# Patient Record
Sex: MALE | Race: WHITE | Employment: OTHER | URBAN - METROPOLITAN AREA
[De-identification: names, ages, dates, MRNs, and addresses within clinical notes are randomized per-mention and may not be internally consistent; named-entity substitution may affect disease eponyms.]

---

## 2018-06-16 ENCOUNTER — OFFICE VISIT (OUTPATIENT)
Dept: URGENT CARE | Facility: URGENT CARE | Age: 63
End: 2018-06-16

## 2018-06-16 VITALS
RESPIRATION RATE: 16 BRPM | WEIGHT: 265 LBS | OXYGEN SATURATION: 96 % | TEMPERATURE: 97.7 F | HEART RATE: 89 BPM | DIASTOLIC BLOOD PRESSURE: 75 MMHG | SYSTOLIC BLOOD PRESSURE: 141 MMHG

## 2018-06-16 DIAGNOSIS — L03.032 CELLULITIS OF GREAT TOE OF LEFT FOOT: Primary | ICD-10-CM

## 2018-06-16 PROCEDURE — 99213 OFFICE O/P EST LOW 20 MIN: CPT | Performed by: INTERNAL MEDICINE

## 2018-06-16 RX ORDER — ATORVASTATIN CALCIUM 10 MG/1
10 TABLET, FILM COATED ORAL DAILY
COMMUNITY

## 2018-06-16 RX ORDER — MUPIROCIN 20 MG/G
OINTMENT TOPICAL 3 TIMES DAILY
Qty: 22 G | Refills: 1 | Status: SHIPPED | OUTPATIENT
Start: 2018-06-16 | End: 2018-06-21

## 2018-06-16 RX ORDER — TESTOSTERONE 1.62 MG/G
GEL TRANSDERMAL
COMMUNITY

## 2018-06-16 RX ORDER — SULFAMETHOXAZOLE/TRIMETHOPRIM 800-160 MG
1 TABLET ORAL 2 TIMES DAILY
Qty: 20 TABLET | Refills: 0 | Status: SHIPPED | OUTPATIENT
Start: 2018-06-16

## 2018-06-18 ENCOUNTER — OFFICE VISIT (OUTPATIENT)
Dept: URGENT CARE | Facility: URGENT CARE | Age: 63
End: 2018-06-18

## 2018-06-18 VITALS
TEMPERATURE: 97 F | OXYGEN SATURATION: 96 % | RESPIRATION RATE: 16 BRPM | HEART RATE: 87 BPM | SYSTOLIC BLOOD PRESSURE: 128 MMHG | DIASTOLIC BLOOD PRESSURE: 82 MMHG

## 2018-06-18 DIAGNOSIS — N48.1 BALANITIS: ICD-10-CM

## 2018-06-18 DIAGNOSIS — L03.032 CELLULITIS OF TOE OF LEFT FOOT: Primary | ICD-10-CM

## 2018-06-18 PROCEDURE — 99214 OFFICE O/P EST MOD 30 MIN: CPT

## 2018-06-18 RX ORDER — CLINDAMYCIN HCL 300 MG
300 CAPSULE ORAL 4 TIMES DAILY
Qty: 32 CAPSULE | Refills: 0 | Status: SHIPPED | OUTPATIENT
Start: 2018-06-18

## 2018-06-18 RX ORDER — FLUCONAZOLE 150 MG/1
150 TABLET ORAL
Qty: 3 TABLET | Refills: 0 | Status: SHIPPED | OUTPATIENT
Start: 2018-06-18

## 2018-06-18 NOTE — PATIENT INSTRUCTIONS
Discharge Instructions for Cellulitis  You have been diagnosed with cellulitis. This is an infection in the deepest layer of the skin. In some cases, the infection also affects the muscle. Cellulitis is caused by bacteria. The bacteria can enter the body through broken skin. This can happen with a cut, scratch, animal bite, or an insect bite that has been scratched. You may have been treated in the hospital with antibiotics and fluids. You will likely be given a prescription for antibiotics to take at home. This sheet will help you take care of yourself at home.  Home care  When you are home:    Take the prescribed antibiotic medicine you are given as directed until it is gone. Take it even if you feel better. It treats the infection and stops it from returning. Not taking all the medicine can make future infections hard to treat.    Keep the infected area clean.    When possible, raise the infected area above the level of your heart. This helps keep swelling down.    Talk with your healthcare provider if you are in pain. Ask what kind of over-the-counter medicine you can take for pain.    Apply clean bandages as advised.    Take your temperature once a day for a week.    Wash your hands often to prevent spreading the infection.  In the future, wash your hands before and after you touch cuts, scratches, or bandages. This will help prevent infection.   When to call your healthcare provider  Call your healthcare provider immediately if you have any of the following:    Difficulty or pain when moving the joints above or below the infected area    Discharge or pus draining from the area    Fever of 100.4 F (38 C) or higher, or as directed by your healthcare provider    Pain that gets worse in or around the infected     Redness that gets worse in or around the infected area, particularly if the area of redness expands to a wider area    Shaking chills    Swelling of the infected area    Vomiting   Date Last Reviewed:  8/1/2016 2000-2017 [employer]. [address] [place] [address], [place] [address]. All rights reserved. This information is not intended as a substitute for professional medical care. Always follow your healthcare professional's instructions.      Balanitis    Balanitis is an inflammation of the head of the penis. It can happen because of a buildup of germs (bacteria, viruses, or fungi) under the foreskin. It can also happen because of exposure to soaps and other chemicals. In adults, this is most often a complication of diabetes. It can also happen because of obesity or poor genital cleaning habits.  If it is not treated right away, this can lead to a condition called phimosis. This means you cannot pull back the foreskin from the head of the penis.  Symptoms of balanitis may include pain or tenderness of the penis, discharge, inability to retract the foreskin, difficulty urinating, and impotence.  Home care  The following guidelines will help you care for your condition at home:    If you are able to retract your foreskin:  ? Children. Retract the foreskin and clean with water. Apply antibiotic cream or ointment to the penis three times a day.  ? Adults. Retract the foreskin and clean with water. Apply clotrimazole cream to the penis 3 times a day unless another medicine was prescribed. Clotrimazole cream is available over the counter. Avoid sexual activity while being treated.  ? Sitz baths. Soak the penis and foreskin in warm water while inflammation is present.    If you have diabetes, talk with your doctor about keeping your diabetes in good control.    If you are overweight, talk with your doctor about a weight loss plan.  Follow-up care  Follow up with your healthcare provider, or as advised.  When to seek medical advice  Call your healthcare provider right away if any of these occur:    You can't retract the foreskin    You can't return the retracted foreskin to the forward position. This requires  immediate attention!    Your symptoms get worse    You have partial or complete blockage of the flow of urine  Date Last Reviewed: 9/1/2016 2000-2017 [employer]. [address] [place] [address], [place] [address]. All rights reserved. This information is not intended as a substitute for professional medical care. Always follow your healthcare professional's instructions.    Please take clindamycin 4x a day with food.  Take a probiotic.  Call if worsening condition.  For your fungal infection take diflucan 150 mg every 3 days for a total of 3 doses.  Keep area clearn, no sex.  Please follow up with PCP after treatment for both conditions for re-evaluation.    Yoan Guan MD

## 2018-06-18 NOTE — MR AVS SNAPSHOT
After Visit Summary   6/18/2018    Wander Mayfield    MRN: [id number]           Patient Information     Date Of Birth          1955        Visit Information        Provider Department      6/18/2018 5:15 PM  URGENT CARE [facility]        Today's Diagnoses     Cellulitis of toe of left foot    -  1    Balanitis          Care Instructions      Discharge Instructions for Cellulitis  You have been diagnosed with cellulitis. This is an infection in the deepest layer of the skin. In some cases, the infection also affects the muscle. Cellulitis is caused by bacteria. The bacteria can enter the body through broken skin. This can happen with a cut, scratch, animal bite, or an insect bite that has been scratched. You may have been treated in the hospital with antibiotics and fluids. You will likely be given a prescription for antibiotics to take at home. This sheet will help you take care of yourself at home.  Home care  When you are home:    Take the prescribed antibiotic medicine you are given as directed until it is gone. Take it even if you feel better. It treats the infection and stops it from returning. Not taking all the medicine can make future infections hard to treat.    Keep the infected area clean.    When possible, raise the infected area above the level of your heart. This helps keep swelling down.    Talk with your healthcare provider if you are in pain. Ask what kind of over-the-counter medicine you can take for pain.    Apply clean bandages as advised.    Take your temperature once a day for a week.    Wash your hands often to prevent spreading the infection.  In the future, wash your hands before and after you touch cuts, scratches, or bandages. This will help prevent infection.   When to call your healthcare provider  Call your healthcare provider immediately if you have any of the following:    Difficulty or pain when moving the joints above or below the infected  area    Discharge or pus draining from the area    Fever of 100.4 F (38 C) or higher, or as directed by your healthcare provider    Pain that gets worse in or around the infected     Redness that gets worse in or around the infected area, particularly if the area of redness expands to a wider area    Shaking chills    Swelling of the infected area    Vomiting   Date Last Reviewed: 8/1/2016 2000-2017 [employer]. [address] [place] [address], [place] [address]. All rights reserved. This information is not intended as a substitute for professional medical care. Always follow your healthcare professional's instructions.      Balanitis    Balanitis is an inflammation of the head of the penis. It can happen because of a buildup of germs (bacteria, viruses, or fungi) under the foreskin. It can also happen because of exposure to soaps and other chemicals. In adults, this is most often a complication of diabetes. It can also happen because of obesity or poor genital cleaning habits.  If it is not treated right away, this can lead to a condition called phimosis. This means you cannot pull back the foreskin from the head of the penis.  Symptoms of balanitis may include pain or tenderness of the penis, discharge, inability to retract the foreskin, difficulty urinating, and impotence.  Home care  The following guidelines will help you care for your condition at home:    If you are able to retract your foreskin:  ? Children. Retract the foreskin and clean with water. Apply antibiotic cream or ointment to the penis three times a day.  ? Adults. Retract the foreskin and clean with water. Apply clotrimazole cream to the penis 3 times a day unless another medicine was prescribed. Clotrimazole cream is available over the counter. Avoid sexual activity while being treated.  ? Sitz baths. Soak the penis and foreskin in warm water while inflammation is present.    If you have diabetes, talk with your doctor about keeping your  diabetes in good control.    If you are overweight, talk with your doctor about a weight loss plan.  Follow-up care  Follow up with your healthcare provider, or as advised.  When to seek medical advice  Call your healthcare provider right away if any of these occur:    You can't retract the foreskin    You can't return the retracted foreskin to the forward position. This requires immediate attention!    Your symptoms get worse    You have partial or complete blockage of the flow of urine  Date Last Reviewed: 9/1/2016 2000-2017 [employer]. [address] [place] [address]. All rights reserved. This information is not intended as a substitute for professional medical care. Always follow your healthcare professional's instructions.    Please take clindamycin 4x a day with food.  Take a probiotic.  Call if worsening condition.  For your fungal infection take diflucan 150 mg every 3 days for a total of 3 doses.  Keep area clearn, no sex.  Please follow up with PCP after treatment for both conditions for re-evaluation.    Yoan Guan MD            Follow-ups after your visit        Follow-up notes from your care team     Return in about 1 week (around 6/25/2018) for Routine Visit.      Who to contact     If you have questions or need follow up information about today's clinic visit or your schedule please contact [facility] directly at [phone].  Normal or non-critical lab and imaging results will be communicated to you by MyChart, letter or phone within 4 business days after the clinic has received the results. If you do not hear from us within 7 days, please contact the clinic through MyChart or phone. If you have a critical or abnormal lab result, we will notify you by phone as soon as possible.  Submit refill requests through [employer] or call your pharmacy and they will forward the refill request to us. Please allow 3 business days for your refill to be  completed.          Additional Information About Your Visit        Care EveryWhere ID     This is your Care EveryWhere ID. This could be used by other organizations to access your [facility] medical records  [id number]        Your Vitals Were     Pulse Temperature Respirations Pulse Oximetry          87 97  F (36.1  C) (Oral) 16 96%         Blood Pressure from Last 3 Encounters:   06/18/18 128/82   06/16/18 141/75    Weight from Last 3 Encounters:   06/16/18 265 lb (120.2 kg)              Today, you had the following     No orders found for display         Today's Medication Changes          These changes are accurate as of 6/18/18  6:07 PM.  If you have any questions, ask your nurse or doctor.               Start taking these medicines.        Dose/Directions    clindamycin 300 MG capsule   Commonly known as:  CLEOCIN   Used for:  Cellulitis of toe of left foot        Dose:  300 mg   Take 1 capsule (300 mg) by mouth 4 times daily   Quantity:  32 capsule   Refills:  0       fluconazole 150 MG tablet   Commonly known as:  DIFLUCAN   Used for:  Balanitis        Dose:  150 mg   Take 1 tablet (150 mg) by mouth every 3 days   Quantity:  3 tablet   Refills:  0            Where to get your medicines      These medications [facility] to [facility] [address] [place] [address] [address] [address]    Hours:  24-hours Phone:  [phone]     clindamycin 300 MG capsule    fluconazole 150 MG tablet                Primary Care Provider Fax #    Physician No Ref-Primary [phone]       No address on file        Equal Access to Services     FREDDY DEAL AH: Angel Hatfield, waaxda luqadaha, qaybta kaalmada jeff turner. [facility] [phone].    ATENCIÓN: Si habla español, tiene a arevalo disposición servicios gratuitos de asistencia lingüística. Llame al [phone].    We comply with applicable federal civil rights  laws and [place] laws. We do not discriminate on the basis of race, color, national origin, age, disability, sex, sexual orientation, or gender identity.            Thank you!     Thank you for choosing [facility]  for your care. Our goal is always to provide you with excellent care. Hearing back from our patients is one way we can continue to improve our services. Please take a few minutes to complete the written survey that you may receive in the mail after your visit with us. Thank you!             Your Updated Medication List - Protect others around you: Learn how to safely use, store and throw away your medicines at www.disposemymeds.org.          This list is accurate as of 6/18/18  6:07 PM.  Always use your most recent med list.                   Brand Name Dispense Instructions for use Diagnosis    amoxicillin-clavulanate 875-125 MG per tablet    AUGMENTIN     Take 1 tablet by mouth 2 times daily        ANDROGEL 1.62% PUMP 20.25 MG/ACT gel   Generic drug:  testosterone           atorvastatin 10 MG tablet    LIPITOR     Take 10 mg by mouth daily        clindamycin 300 MG capsule    CLEOCIN    32 capsule    Take 1 capsule (300 mg) by mouth 4 times daily    Cellulitis of toe of left foot       DULOXETINE HCL PO      Take 20 mg by mouth daily        fluconazole 150 MG tablet    DIFLUCAN    3 tablet    Take 1 tablet (150 mg) by mouth every 3 days    Balanitis       GABAPENTIN PO      Take 300 mg by mouth 4 times daily        HYDRALAZINE HCL PO      Take 25 mg by mouth 2 times daily        JANUVIA PO      Take 100 mg by mouth daily        LAMOTRIGINE PO      Take 150 mg by mouth daily        METFORMIN HCL PO      Take 1,000 mg by mouth 2 times daily (with meals)        mupirocin 2 % ointment    BACTROBAN    22 g    Apply topically 3 times daily for 5 days    Cellulitis of great toe of left foot       sulfamethoxazole-trimethoprim 800-160 MG per tablet    BACTRIM DS/SEPTRA DS    20 tablet     Take 1 tablet by mouth 2 times daily    Cellulitis of great toe of left foot

## 2018-06-18 NOTE — PROGRESS NOTES
SUBJECTIVE:   Wander Mayfield is a 62 year old male who presents to clinic today for the following health issues:    Cellulitis of 1st digit of left foot      Duration: 1 week    Description (location/character/radiation): first digit of left foot    Intensity:  0/10    Accompanying signs and symptoms: Associated redness and swelling however significantly improved over the last 48 hours    History (similar episodes/previous evaluation): None    Precipitating or alleviating factors:     Therapies tried and outcome: Initially started augmentin and bactrim however stopped early.  Started on bactrim on 6/16 with compliance for a duration of 10 days.  Will finish Augmentin in on 6/20.       Follow-up from  visit this weekend.  Hx of diabetes.   Dressing changes daily and keeping clean with soap.  No soaks.  No pus, discharge, or drainage.  Denies fevers or chills.  Endorses white discharge from phalanx with skin sloughing.   He has a prior history of skin reaction to bactrim.  No new sexual partners.      Problem list and histories reviewed & adjusted, as indicated.  Additional history: as documented    There is no problem list on file for this patient.    No past surgical history on file.    Social History   Substance Use Topics     Smoking status: Never Smoker     Smokeless tobacco: Never Used     Alcohol use Yes     Family History   Problem Relation Age of Onset     Breast Cancer Mother      Kidney Cancer Father      No Known Problems Maternal Grandmother      No Known Problems Maternal Grandfather      No Known Problems Paternal Grandmother      No Known Problems Paternal Grandfather          Current Outpatient Prescriptions   Medication Sig Dispense Refill     amoxicillin-clavulanate (AUGMENTIN) 875-125 MG per tablet Take 1 tablet by mouth 2 times daily       atorvastatin (LIPITOR) 10 MG tablet Take 10 mg by mouth daily       DULOXETINE HCL PO Take 20 mg by mouth daily       GABAPENTIN PO Take 300 mg by mouth 4  times daily       HYDRALAZINE HCL PO Take 25 mg by mouth 2 times daily       LAMOTRIGINE PO Take 150 mg by mouth daily       METFORMIN HCL PO Take 1,000 mg by mouth 2 times daily (with meals)       mupirocin (BACTROBAN) 2 % ointment Apply topically 3 times daily for 5 days 22 g 1     SITagliptin Phosphate (JANUVIA PO) Take 100 mg by mouth daily       sulfamethoxazole-trimethoprim (BACTRIM DS/SEPTRA DS) 800-160 MG per tablet Take 1 tablet by mouth 2 times daily 20 tablet 0     testosterone (ANDROGEL 1.62% PUMP) 20.25 MG/ACT gel        No Known Allergies  No lab results found.   BP Readings from Last 3 Encounters:   06/18/18 128/82   06/16/18 141/75    Wt Readings from Last 3 Encounters:   06/16/18 265 lb (120.2 kg)             Reviewed and updated as needed this visit by clinical staff  Tobacco  Allergies  Meds       Reviewed and updated as needed this visit by Provider         ROS:  Constitutional, HEENT, cardiovascular, pulmonary, gi and gu systems are negative, except as otherwise noted.    OBJECTIVE:     /82  Pulse 87  Temp 97  F (36.1  C) (Oral)  Resp 16  SpO2 96%  There is no height or weight on file to calculate BMI.  GENERAL: healthy, alert and no distress  EYES: Eyes grossly normal to inspection, PERRL and conjunctivae and sclerae normal  NECK: no adenopathy, no asymmetry, masses, or scars and thyroid normal to palpation  RESP: lungs clear to auscultation - no rales, rhonchi or wheezes  CV: regular rate and rhythm, normal S1 S2, no S3 or S4, no murmur, click or rub, no peripheral edema and peripheral pulses strong   (male): Inflamed glans with thick white discharge from urethra and around glans.  Non-tender to palpation.  No scrotal edema or swelling.     MS: no gross musculoskeletal defects noted, no edema.  5/5 strength and sensation.  DP intact bilaterally.    SKIN: 2nd degree ulceration of sole of 1st digit of left foot with granulation tissue.  Mild erythema without warmth of 1st digit  with no joint tenderness to palpation. No radha pus or discharge.  Blister of medial surface of 1st digit.    Diagnostic Test Results:  none     ASSESSMENT/PLAN:   1. Cellulitis of toe of left foot  Improvement with only involvement of digit.  No joint tenderness of 1st digit of left foot.  No systemic signs of infection.  Reaction to medication resulting in phalanx skin sensitivity and fungal infection.  Transitioned from bactrim to clindamycin to limit skin sensitivity to complete 10 day course of antibiotics.  - wound care involving soaks, dressings, changes, covering while participating in activities  - clindamycin (CLEOCIN) 300 MG capsule; Take 1 capsule (300 mg) by mouth 4 times daily  Dispense: 32 capsule; Refill: 0    2. Balanitis  Most likely from recent systemic antibiotics.  Hx of fungal infections in past from systemic antibiotics.  No new sexual partners.    - fluconazole (DIFLUCAN) 150 MG tablet; Take 1 tablet (150 mg) by mouth every 3 days  Dispense: 3 tablet; Refill: 0    See Patient Instructions    Yoan Guan MD   Urgent Care Provider  [facility]

## 2018-06-24 NOTE — PROGRESS NOTES
[facility] Progress Note        Keeley Nielson MD, MPH  6-        History:      A pleasant 62 year old year old male is seen for left great toe redness and suspected infection. The patient has a Hx of type II diabetes with associated peripheral neuropathy. He was treated with augmentin starting on 6- [place]. He is on a business trip in [place]. He states that he had noted a slight yellowsih drainage from his left great toe at the onset of his left foot great toe infection a week ago which has stopped. However he is here today due to persisting erythema and mild swelling of his left great toe. No systemic symptoms of fever or chills are referred.No recent left foot trauma or burn injuries are referred.         Assessment and Plan:         Cellulitis of great toe of left foot with an existing plantar ulcer:  - mupirocin (BACTROBAN) 2 % ointment; Apply topically 3 times daily for 5 days  Dispense: 22 g; Refill: 1  Advised to continue with augmentin until completed.  - sulfamethoxazole-trimethoprim (BACTRIM DS/SEPTRA DS) 800-160 MG per tablet; Take 1 tablet by mouth 2 times daily  Dispense: 20 tablet; Refill: 0  A post op shoe is provided for support with ambulation.  Advised patient to avoid weight bearing on the left foot as much as possible to faclitate healing of the left foot great toe plantar ulcer.    Advised to wash left foot great toe ulcer with soap and water and keep it covered with dressing.  Advised to avoid any constrictive adhesive taping of the left great toe.  Follow-up with your PCP or a medical provider in 2 days, earlier if symptoms worsen.                   Physical Exam:      /75 (BP Location: Left arm, Patient Position: Sitting, Cuff Size: Adult Large)  Pulse 89  Temp 97.7  F (36.5  C) (Oral)  Resp 16  Wt 265 lb (120.2 kg)  SpO2 96%     Constitutional: Patient is in no distress The patient is pleasant and cooperative.   HEENT: Head:   Head is atraumatic, normocephalic.    Eyes: Pupils are equal, round and reactive to light and accomodation.  Sclera is non-icteric. No conjunctival injection, or exudate noted. Extraocular motion is intact. Visual acuity is intact bilaterally.  Ears:  External acoustic canals are patent and clear.  There is no erythema and bulging( exudate)  of the ( R/L ) tympanic membrane(s ).   Nose:  No Nasal congestion w/o drainage or mucosal ulceration is noted.  Throat:  Oral mucosa is moist.  No oral lesions are noted.  No posterior pharyngeal hyperemia or exudate noted.     Neck Supple.  There is no cervical lymphadenopathy.  No nuchal rigidity noted.  There is no meningismus.     Cardiovascular: Heart is regular to rate and rhythm.  No murmur is noted.     Chest. Chest Symmetrical, no soft tissues, swelling, or tenderness upon palpation   Lungs: Clear in the anterior and posterior pulmonary fields.   Abdomen: Soft and non-tender.    Back No flank tenderness is noted.   Extremeties No edema, no calf tenderness. Left great toe plantar surface ulcer with associated cellulitis without drainage. Good ROM of the left great toe. Peripheral neuropathy as noted above. No acute perfusion deficit of the left foot. Peripheral pulses are intact. No calf tenderness bilaterally.   Neuro: No focal deficit.   Skin No petechiae or purpura is noted.  There is no rash.   Mood Normal              Medications:        PRN Meds:          Data:      All new lab and imaging data was reviewed.   No results found for this or any previous visit.